# Patient Record
Sex: MALE | Race: WHITE | NOT HISPANIC OR LATINO | URBAN - METROPOLITAN AREA
[De-identification: names, ages, dates, MRNs, and addresses within clinical notes are randomized per-mention and may not be internally consistent; named-entity substitution may affect disease eponyms.]

---

## 2023-06-06 ENCOUNTER — APPOINTMENT (OUTPATIENT)
Dept: URBAN - METROPOLITAN AREA CLINIC 225 | Age: 28
Setting detail: DERMATOLOGY
End: 2023-06-06

## 2023-06-06 DIAGNOSIS — B07.8 OTHER VIRAL WARTS: ICD-10-CM

## 2023-06-06 PROBLEM — D23.72 OTHER BENIGN NEOPLASM OF SKIN OF LEFT LOWER LIMB, INCLUDING HIP: Status: ACTIVE | Noted: 2023-06-06

## 2023-06-06 PROCEDURE — OTHER CONSULTATION FOR EXCISION OF BENIGN LESIONS: OTHER

## 2023-06-06 PROCEDURE — 99202 OFFICE O/P NEW SF 15 MIN: CPT | Mod: 25

## 2023-06-06 PROCEDURE — 17110 DESTRUCT B9 LESION 1-14: CPT

## 2023-06-06 PROCEDURE — OTHER CONSULTATION FOR EXCISION: OTHER

## 2023-06-06 PROCEDURE — OTHER LIQUID NITROGEN: OTHER

## 2023-06-06 PROCEDURE — OTHER COUNSELING: OTHER

## 2023-06-06 ASSESSMENT — LOCATION ZONE DERM
LOCATION ZONE: FINGER
LOCATION ZONE: FEET

## 2023-06-06 ASSESSMENT — LOCATION SIMPLE DESCRIPTION DERM
LOCATION SIMPLE: RIGHT PLANTAR SURFACE
LOCATION SIMPLE: RIGHT MIDDLE FINGER

## 2023-06-06 ASSESSMENT — LOCATION DETAILED DESCRIPTION DERM
LOCATION DETAILED: RIGHT PLANTAR FOREFOOT OVERLYING 1ST METATARSAL
LOCATION DETAILED: RIGHT PROXIMAL PALMAR MIDDLE FINGER

## 2023-06-06 NOTE — PROCEDURE: LIQUID NITROGEN
Show Topical Anesthesia Variable?: Yes
Medical Necessity Information: It is in your best interest to select a reason for this procedure from the list below. All of these items fulfill various CMS LCD requirements except the new and changing color options.
Post-Care Instructions: I reviewed with the patient in detail post-care instructions. Patient is to wear sunprotection, and avoid picking at any of the treated lesions. Pt may apply Vaseline to crusted or scabbing areas.
Detail Level: Detailed
Render Post-Care Instructions In Note?: no
Spray Paint Text: The liquid nitrogen was applied to the skin utilizing a spray paint frosting technique.
Medical Necessity Clause: This procedure was medically necessary because the lesions that were treated were:
Consent: The patient's consent was obtained including but not limited to risks of crusting, scabbing, blistering, scarring, darker or lighter pigmentary change, recurrence, incomplete removal and infection.
Pared With?: 15 blade

## 2023-06-15 ENCOUNTER — APPOINTMENT (OUTPATIENT)
Dept: URBAN - METROPOLITAN AREA CLINIC 227 | Age: 28
Setting detail: DERMATOLOGY
End: 2023-06-15

## 2023-06-15 PROBLEM — D23.72 OTHER BENIGN NEOPLASM OF SKIN OF LEFT LOWER LIMB, INCLUDING HIP: Status: ACTIVE | Noted: 2023-06-15

## 2023-06-15 PROCEDURE — OTHER PRESCRIPTION: OTHER

## 2023-06-15 PROCEDURE — 12032 INTMD RPR S/A/T/EXT 2.6-7.5: CPT | Mod: 79

## 2023-06-15 PROCEDURE — A4550 SURGICAL TRAYS: HCPCS

## 2023-06-15 PROCEDURE — OTHER EXCISION: OTHER

## 2023-06-15 PROCEDURE — 11404 EXC TR-EXT B9+MARG 3.1-4 CM: CPT | Mod: 79

## 2023-06-15 RX ORDER — CEPHALEXIN 500 MG/1
CAPSULE ORAL
Qty: 14 | Refills: 0 | Status: ERX | COMMUNITY
Start: 2023-06-15

## 2023-06-15 NOTE — PROCEDURE: EXCISION

## 2023-06-29 ENCOUNTER — APPOINTMENT (OUTPATIENT)
Dept: URBAN - METROPOLITAN AREA CLINIC 227 | Age: 28
Setting detail: DERMATOLOGY
End: 2023-06-29

## 2023-06-29 DIAGNOSIS — D22 MELANOCYTIC NEVI: ICD-10-CM

## 2023-06-29 DIAGNOSIS — B07.8 OTHER VIRAL WARTS: ICD-10-CM

## 2023-06-29 DIAGNOSIS — Z48.02 ENCOUNTER FOR REMOVAL OF SUTURES: ICD-10-CM

## 2023-06-29 PROBLEM — D22.71 MELANOCYTIC NEVI OF RIGHT LOWER LIMB, INCLUDING HIP: Status: ACTIVE | Noted: 2023-06-29

## 2023-06-29 PROCEDURE — OTHER COUNSELING: OTHER

## 2023-06-29 PROCEDURE — 17110 DESTRUCT B9 LESION 1-14: CPT

## 2023-06-29 PROCEDURE — 99212 OFFICE O/P EST SF 10 MIN: CPT | Mod: 25

## 2023-06-29 PROCEDURE — OTHER SUTURE REMOVAL (GLOBAL PERIOD): OTHER

## 2023-06-29 PROCEDURE — OTHER LIQUID NITROGEN: OTHER

## 2023-06-29 ASSESSMENT — LOCATION ZONE DERM
LOCATION ZONE: FINGER
LOCATION ZONE: FEET
LOCATION ZONE: LEG

## 2023-06-29 ASSESSMENT — LOCATION DETAILED DESCRIPTION DERM
LOCATION DETAILED: RIGHT PROXIMAL PALMAR MIDDLE FINGER
LOCATION DETAILED: RIGHT PLANTAR FOREFOOT OVERLYING 1ST METATARSAL
LOCATION DETAILED: RIGHT MEDIAL PLANTAR HEEL
LOCATION DETAILED: LEFT PROXIMAL PRETIBIAL REGION

## 2023-06-29 ASSESSMENT — LOCATION SIMPLE DESCRIPTION DERM
LOCATION SIMPLE: RIGHT MIDDLE FINGER
LOCATION SIMPLE: LEFT PRETIBIAL REGION
LOCATION SIMPLE: RIGHT PLANTAR SURFACE

## 2023-06-29 NOTE — PROCEDURE: COUNSELING
Detail Level: Detailed
Sunscreen Recommendations: Recommend broad spectrumm mineral based SPF 30+
Detail Level: Simple

## 2023-06-29 NOTE — PROCEDURE: LIQUID NITROGEN
Detail Level: Detailed
Show Spray Paint Technique Variable?: Yes
Consent: The patient's consent was obtained including but not limited to risks of crusting, scabbing, blistering, scarring, darker or lighter pigmentary change, recurrence, incomplete removal and infection.
Include Z78.9 (Other Specified Conditions Influencing Health Status) As An Associated Diagnosis?: No
Post-Care Instructions: I reviewed with the patient in detail post-care instructions. Patient is to wear sunprotection, and avoid picking at any of the treated lesions. Pt may apply Vaseline to crusted or scabbing areas.
Medical Necessity Information: It is in your best interest to select a reason for this procedure from the list below. All of these items fulfill various CMS LCD requirements except the new and changing color options.
Spray Paint Text: The liquid nitrogen was applied to the skin utilizing a spray paint frosting technique.
Medical Necessity Clause: This procedure was medically necessary because the lesions that were treated were:
Pared With?: 15 blade

## 2023-06-29 NOTE — PROCEDURE: SUTURE REMOVAL (GLOBAL PERIOD)
Detail Level: Detailed
Add 99154 Cpt? (Important Note: In 2017 The Use Of 02239 Is Being Tracked By Cms To Determine Future Global Period Reimbursement For Global Periods): no

## 2023-07-24 ENCOUNTER — APPOINTMENT (OUTPATIENT)
Dept: URBAN - METROPOLITAN AREA CLINIC 227 | Age: 28
Setting detail: DERMATOLOGY
End: 2023-07-24

## 2023-07-24 DIAGNOSIS — B07.8 OTHER VIRAL WARTS: ICD-10-CM

## 2023-07-24 PROCEDURE — OTHER MEDICAL CONSULTATION: PULSED-DYE LASER: OTHER

## 2023-07-24 PROCEDURE — OTHER BENIGN DESTRUCTION (PDL) MULTI: OTHER

## 2023-07-24 PROCEDURE — 17110 DESTRUCT B9 LESION 1-14: CPT

## 2023-07-24 PROCEDURE — OTHER BENIGN DESTRUCTION (LASER): OTHER

## 2023-07-24 ASSESSMENT — LOCATION DETAILED DESCRIPTION DERM
LOCATION DETAILED: RIGHT PLANTAR FOREFOOT OVERLYING 1ST METATARSAL
LOCATION DETAILED: RIGHT PROXIMAL PALMAR MIDDLE FINGER
LOCATION DETAILED: RIGHT PLANTAR FOREFOOT OVERLYING 2ND METATARSAL

## 2023-07-24 ASSESSMENT — LOCATION SIMPLE DESCRIPTION DERM
LOCATION SIMPLE: RIGHT PLANTAR SURFACE
LOCATION SIMPLE: RIGHT MIDDLE FINGER

## 2023-07-24 ASSESSMENT — LOCATION ZONE DERM
LOCATION ZONE: FEET
LOCATION ZONE: FINGER

## 2023-07-24 NOTE — PROCEDURE: BENIGN DESTRUCTION (LASER)
Detail Level: Zone
Total Number Of Lesions Treated: 5
Laser Type: Vbeam 595nm
Spot Size: 7 mm
Fluence: 12.5
Cryogen Time (Ms): 30
Delay Time (Ms): 20
Pulse Duration: .45 ms
Total Pulses: 9
Pre-Procedure: Prior to the procedure all persons present put on protective eyewear.
Post-Procedure: Following the procedure vaseline and ice was applied to the treatment areas and post care was reviewed with the patient.
Consent: Written consent obtained, risks reviewed including but not limited to crusting, scabbing, blistering, scarring, darker or lighter pigmentary change, incidental hair removal, bruising, and/or incomplete removal.
Post-Care Instructions: I reviewed with the patient in detail post-care instructions. Patient should stay away from the sun and wear sun protection until treated areas are fully healed.
Add 52 Modifier (Optional): no

## 2023-08-07 ENCOUNTER — APPOINTMENT (OUTPATIENT)
Dept: URBAN - METROPOLITAN AREA CLINIC 227 | Age: 28
Setting detail: DERMATOLOGY
End: 2023-08-08

## 2023-08-07 DIAGNOSIS — B07.8 OTHER VIRAL WARTS: ICD-10-CM

## 2023-08-07 PROCEDURE — OTHER BENIGN DESTRUCTION (LASER): OTHER

## 2023-08-07 PROCEDURE — OTHER BENIGN DESTRUCTION (PDL) MULTI: OTHER

## 2023-08-07 PROCEDURE — 17110 DESTRUCT B9 LESION 1-14: CPT

## 2023-08-07 PROCEDURE — OTHER COUNSELING: OTHER

## 2023-08-07 ASSESSMENT — LOCATION DETAILED DESCRIPTION DERM
LOCATION DETAILED: RIGHT PLANTAR FOREFOOT OVERLYING 2ND METATARSAL
LOCATION DETAILED: RIGHT PROXIMAL ULNAR PALMAR MIDDLE FINGER
LOCATION DETAILED: RIGHT PROXIMAL PALMAR MIDDLE FINGER
LOCATION DETAILED: RIGHT PLANTAR FOREFOOT OVERLYING 1ST METATARSAL
LOCATION DETAILED: RIGHT PROXIMAL PALMAR MIDDLE FINGER

## 2023-08-07 ASSESSMENT — LOCATION SIMPLE DESCRIPTION DERM
LOCATION SIMPLE: RIGHT MIDDLE FINGER
LOCATION SIMPLE: RIGHT MIDDLE FINGER
LOCATION SIMPLE: RIGHT PLANTAR SURFACE

## 2023-08-07 ASSESSMENT — LOCATION ZONE DERM
LOCATION ZONE: FINGER
LOCATION ZONE: FEET
LOCATION ZONE: FINGER

## 2023-08-07 NOTE — PROCEDURE: BENIGN DESTRUCTION (LASER)
Pulse Duration: .45 ms
Delay Time (Ms): 20
Pre-Procedure: Prior to the procedure all persons present put on protective eyewear and mask wear.
Post-Procedure: Following the procedure post care was reviewed with the patient.
Spot Size: 7 mm
Fluence: 12.5
Total Number Of Lesions Treated: 5
Total Pulses: 9
Consent: Written consent obtained, risks reviewed including but not limited to crusting, scabbing, blistering, scarring, darker or lighter pigmentary change, incidental hair removal, bruising, and/or incomplete removal.
Post-Care Instructions: I reviewed with the patient in detail post-care instructions. Patient should stay away from the sun and wear sun protection until treated areas are fully healed.
Detail Level: Zone
Laser Type: Vbeam 595nm
Add 52 Modifier (Optional): no

## 2023-08-21 ENCOUNTER — APPOINTMENT (OUTPATIENT)
Dept: URBAN - METROPOLITAN AREA CLINIC 227 | Age: 28
Setting detail: DERMATOLOGY
End: 2023-08-21

## 2023-08-21 DIAGNOSIS — B07.8 OTHER VIRAL WARTS: ICD-10-CM

## 2023-08-21 PROCEDURE — OTHER BENIGN DESTRUCTION (PDL) MULTI: OTHER

## 2023-08-21 PROCEDURE — OTHER COUNSELING: OTHER

## 2023-08-21 PROCEDURE — OTHER BENIGN DESTRUCTION (LASER): OTHER

## 2023-08-21 PROCEDURE — 17110 DESTRUCT B9 LESION 1-14: CPT

## 2023-08-21 ASSESSMENT — LOCATION ZONE DERM
LOCATION ZONE: FEET
LOCATION ZONE: FINGER
LOCATION ZONE: FINGER

## 2023-08-21 ASSESSMENT — LOCATION DETAILED DESCRIPTION DERM
LOCATION DETAILED: RIGHT PROXIMAL PALMAR MIDDLE FINGER
LOCATION DETAILED: RIGHT PROXIMAL ULNAR PALMAR MIDDLE FINGER
LOCATION DETAILED: RIGHT PLANTAR FOREFOOT OVERLYING 1ST METATARSAL
LOCATION DETAILED: RIGHT PROXIMAL PALMAR MIDDLE FINGER
LOCATION DETAILED: RIGHT PLANTAR FOREFOOT OVERLYING 2ND METATARSAL

## 2023-08-21 ASSESSMENT — LOCATION SIMPLE DESCRIPTION DERM
LOCATION SIMPLE: RIGHT PLANTAR SURFACE
LOCATION SIMPLE: RIGHT MIDDLE FINGER
LOCATION SIMPLE: RIGHT MIDDLE FINGER

## 2023-08-21 NOTE — PROCEDURE: BENIGN DESTRUCTION (LASER)
Pulse Duration: .45 ms
Delay Time (Ms): 20
Pre-Procedure: Prior to the procedure all persons present put on protective eyewear and mask wear.
Post-Procedure: Following the procedure post care was reviewed with the patient.
Spot Size: 7 mm
Fluence: 12.5
Total Number Of Lesions Treated: 7
Total Pulses: 15
Consent: Written consent obtained, risks reviewed including but not limited to crusting, scabbing, blistering, scarring, darker or lighter pigmentary change, incidental hair removal, bruising, and/or incomplete removal.
Post-Care Instructions: I reviewed with the patient in detail post-care instructions. Patient should stay away from the sun and wear sun protection until treated areas are fully healed.
Detail Level: Zone
Laser Type: Vbeam 595nm
Add 52 Modifier (Optional): no

## 2023-09-11 ENCOUNTER — APPOINTMENT (OUTPATIENT)
Dept: URBAN - METROPOLITAN AREA CLINIC 227 | Age: 28
Setting detail: DERMATOLOGY
End: 2023-09-11

## 2023-09-11 DIAGNOSIS — B07.8 OTHER VIRAL WARTS: ICD-10-CM

## 2023-09-11 PROCEDURE — OTHER COUNSELING: OTHER

## 2023-09-11 PROCEDURE — OTHER MIPS QUALITY: OTHER

## 2023-09-11 PROCEDURE — 17110 DESTRUCT B9 LESION 1-14: CPT

## 2023-09-11 PROCEDURE — OTHER PRESCRIPTION MEDICATION MANAGEMENT: OTHER

## 2023-09-11 PROCEDURE — OTHER BENIGN DESTRUCTION (PDL) MULTI: OTHER

## 2023-09-11 PROCEDURE — OTHER BENIGN DESTRUCTION (LASER): OTHER

## 2023-09-11 PROCEDURE — OTHER PRESCRIPTION: OTHER

## 2023-09-11 RX ORDER — PHARMACY COMPOUNDING ACCESSORY
EACH MISCELLANEOUS
Qty: 1 | Refills: 1 | Status: ERX | COMMUNITY
Start: 2023-09-11

## 2023-09-11 ASSESSMENT — LOCATION ZONE DERM
LOCATION ZONE: FINGER
LOCATION ZONE: FEET

## 2023-09-11 ASSESSMENT — LOCATION DETAILED DESCRIPTION DERM
LOCATION DETAILED: RIGHT PROXIMAL PALMAR MIDDLE FINGER
LOCATION DETAILED: RIGHT PLANTAR FOREFOOT OVERLYING 1ST METATARSAL

## 2023-09-11 ASSESSMENT — LOCATION SIMPLE DESCRIPTION DERM
LOCATION SIMPLE: RIGHT PLANTAR SURFACE
LOCATION SIMPLE: RIGHT MIDDLE FINGER

## 2023-09-11 NOTE — PROCEDURE: PRESCRIPTION MEDICATION MANAGEMENT
Render In Strict Bullet Format?: No
Detail Level: Zone
Initiate Treatment: Topical adhesive:\\n   \\n   Fluorouracil 5%/Salicylic Acid 80% adhesive peel, AAA QD HS, cover with duct tape, wash off in morning.

## 2023-09-11 NOTE — PROCEDURE: BENIGN DESTRUCTION (LASER)
Post-Care Instructions: I reviewed with the patient in detail post-care instructions. Patient should stay away from the sun and wear sun protection until treated areas are fully healed.
Post-Procedure: Following the procedure vaseline and ice was applied to the treatment areas and post care was reviewed with the patient.
Add 52 Modifier (Optional): no
Fluence: 12.5
Detail Level: Zone
Total Number Of Lesions Treated: 5
Pre-Procedure: Prior to the procedure all persons present put on protective eyewear.
Consent: Written consent obtained, risks reviewed including but not limited to crusting, scabbing, blistering, scarring, darker or lighter pigmentary change, incidental hair removal, bruising, and/or incomplete removal.
Pulse Duration: .45 ms
Total Pulses: 14
Laser Type: Vbeam 595nm
Spot Size: 7 mm
27-Jun-2022